# Patient Record
Sex: MALE | Race: WHITE | NOT HISPANIC OR LATINO | Employment: OTHER | URBAN - NONMETROPOLITAN AREA
[De-identification: names, ages, dates, MRNs, and addresses within clinical notes are randomized per-mention and may not be internally consistent; named-entity substitution may affect disease eponyms.]

---

## 2022-03-30 ENCOUNTER — OFFICE VISIT (OUTPATIENT)
Dept: FAMILY MEDICINE CLINIC | Facility: CLINIC | Age: 67
End: 2022-03-30

## 2022-03-30 VITALS
WEIGHT: 204 LBS | HEIGHT: 69 IN | SYSTOLIC BLOOD PRESSURE: 176 MMHG | HEART RATE: 96 BPM | DIASTOLIC BLOOD PRESSURE: 118 MMHG | BODY MASS INDEX: 30.21 KG/M2 | OXYGEN SATURATION: 98 %

## 2022-03-30 DIAGNOSIS — I10 PRIMARY HYPERTENSION: Primary | ICD-10-CM

## 2022-03-30 PROBLEM — I16.9 HYPERTENSIVE CRISIS: Status: ACTIVE | Noted: 2020-01-13

## 2022-03-30 PROBLEM — E66.9 OBESITY WITH BODY MASS INDEX 30 OR GREATER: Status: ACTIVE | Noted: 2020-01-13

## 2022-03-30 PROBLEM — N50.89 TESTICULAR MASS: Status: ACTIVE | Noted: 2020-01-13

## 2022-03-30 PROBLEM — I16.9 HYPERTENSIVE CRISIS: Status: RESOLVED | Noted: 2020-01-13 | Resolved: 2022-03-30

## 2022-03-30 PROCEDURE — 99214 OFFICE O/P EST MOD 30 MIN: CPT | Performed by: PHYSICIAN ASSISTANT

## 2022-03-30 RX ORDER — LISINOPRIL AND HYDROCHLOROTHIAZIDE 20; 12.5 MG/1; MG/1
TABLET ORAL
COMMUNITY
Start: 2022-03-16 | End: 2022-04-25

## 2022-03-30 RX ORDER — AMLODIPINE BESYLATE 5 MG/1
5 TABLET ORAL DAILY
Qty: 30 TABLET | Refills: 5 | Status: SHIPPED | OUTPATIENT
Start: 2022-03-30 | End: 2022-04-25

## 2022-03-30 NOTE — PROGRESS NOTES
"Chief Complaint  Hypertension    Subjective          Jacobo Burroughs presents to [facility]  History of Present Illness patient was started on lisinopril/HCTZ about a month ago and is here for a follow-up.  He states that he has been unable to check his blood pressure at home because his machine is broken.  He has no specific complaints today and reports feeling well.    Objective     Vital Signs:   BP (!) 176/118 (BP Location: Left arm, Patient Position: Sitting, Cuff Size: Large Adult)   Pulse 96   Ht 175.3 cm (69\")   Wt 92.5 kg (204 lb)   SpO2 98%   BMI 30.13 kg/m²     Body mass index is 30.13 kg/m².    Review of Systems   Constitutional: Negative.    HENT: Negative.    Respiratory: Negative.    Cardiovascular: Negative.    Gastrointestinal: Negative.    Neurological: Negative.    Psychiatric/Behavioral: Negative.        Past History:  Medical History: has a past medical history of Hypertension and Hypertensive crisis (1/13/2020).   Surgical History: has a past surgical history that includes Tonsillectomy and adenoidectomy.   Family History: family history includes Diabetes in his father; Hypertension in his father.   Social History: reports that he has never smoked. He has never used smokeless tobacco. He reports current alcohol use of about 3.0 standard drinks of alcohol per week. He reports that he does not use drugs.      Current Outpatient Medications:   •  amLODIPine (NORVASC) 5 MG tablet, Take 1 tablet by mouth Daily., Disp: 30 tablet, Rfl: 5  •  lisinopril-hydrochlorothiazide (PRINZIDE,ZESTORETIC) 20-12.5 MG per tablet, , Disp: , Rfl:     Allergies: Penicillins    Physical Exam  Constitutional:       Appearance: Normal appearance.   Cardiovascular:      Rate and Rhythm: Normal rate and regular rhythm.      Heart sounds: Normal heart sounds.   Pulmonary:      Effort: Pulmonary effort is normal.      Breath sounds: Normal breath sounds.   Abdominal:      General: Bowel " sounds are normal.      Palpations: Abdomen is soft.   Musculoskeletal:         General: Normal range of motion.      Cervical back: Normal range of motion.   Neurological:      General: No focal deficit present.      Mental Status: He is alert and oriented to person, place, and time.   Psychiatric:         Mood and Affect: Mood normal.             Assessment and Plan   Diagnoses and all orders for this visit:    1. Primary hypertension (Primary)  Assessment & Plan:  I am going to add amlodipine 5 mg to his current medications and see him back in a month.  Last blood work was done on 2/9/2022 CBC was normal CMP was normal other than his creatinine was 1.47 with a GFR of 48      Other orders  -     amLODIPine (NORVASC) 5 MG tablet; Take 1 tablet by mouth Daily.  Dispense: 30 tablet; Refill: 5          Follow Up   Return in about 1 month (around 4/30/2022).  Patient was given instructions and counseling regarding his condition or for health maintenance advice. Please see specific information pulled into the AVS if appropriate.     Crystal De Oliveira PA-C

## 2022-03-30 NOTE — ASSESSMENT & PLAN NOTE
I am going to add amlodipine 5 mg to his current medications and see him back in a month.  Last blood work was done on 2/9/2022 CBC was normal CMP was normal other than his creatinine was 1.47 with a GFR of 48

## 2022-04-25 ENCOUNTER — OFFICE VISIT (OUTPATIENT)
Dept: FAMILY MEDICINE CLINIC | Facility: CLINIC | Age: 67
End: 2022-04-25

## 2022-04-25 VITALS
HEART RATE: 89 BPM | DIASTOLIC BLOOD PRESSURE: 98 MMHG | SYSTOLIC BLOOD PRESSURE: 146 MMHG | BODY MASS INDEX: 30.26 KG/M2 | HEIGHT: 69 IN | WEIGHT: 204.3 LBS | OXYGEN SATURATION: 96 %

## 2022-04-25 DIAGNOSIS — I10 PRIMARY HYPERTENSION: Primary | ICD-10-CM

## 2022-04-25 PROCEDURE — 99214 OFFICE O/P EST MOD 30 MIN: CPT | Performed by: PHYSICIAN ASSISTANT

## 2022-04-25 RX ORDER — AMLODIPINE BESYLATE AND BENAZEPRIL HYDROCHLORIDE 5; 40 MG/1; MG/1
1 CAPSULE ORAL DAILY
Qty: 30 CAPSULE | Refills: 5 | Status: SHIPPED | OUTPATIENT
Start: 2022-04-25 | End: 2022-04-28

## 2022-04-25 NOTE — ASSESSMENT & PLAN NOTE
His blood pressure has improved but is not adequately controlled he is very hesitant to adding any more medications he does not like the fluid pill because they make him go to the bathroom all the time and he does not feel like he needs to fluid pill.  I have told him that we are going to make a change his medicine and switch him to amlodipine-benazepril 5-40 and see if this will better control his blood pressure.  He will return in a month.

## 2022-04-25 NOTE — PROGRESS NOTES
"Chief Complaint  Hypertension (Patient complains his blood pressure is still running high. The highest being 155/100)    Subjective          Jacobo Burroughs presents to [facility]  History of Present Illness patient follows up today for his blood pressure stating that it may be slightly better controlled but he does not like the fluid pill and the new medicine and he does not really think it is done much to improve his blood pressure.  He does not want to take \"a bunch of pills\"    Objective     Vital Signs:   /98 (BP Location: Right arm, Patient Position: Sitting, Cuff Size: Large Adult)   Pulse 89   Ht 175.3 cm (69\")   Wt 92.7 kg (204 lb 4.8 oz)   SpO2 96%   BMI 30.17 kg/m²     Body mass index is 30.17 kg/m².    Review of Systems   Constitutional: Negative.    HENT: Negative.    Respiratory: Negative.    Cardiovascular: Negative.    Gastrointestinal: Negative.    Neurological: Negative.    Psychiatric/Behavioral: Negative.        Past History:  Medical History: has a past medical history of Hypertension and Hypertensive crisis (1/13/2020).   Surgical History: has a past surgical history that includes Tonsillectomy and adenoidectomy.   Family History: family history includes Diabetes in his father; Hypertension in his father.   Social History: reports that he has never smoked. He has never used smokeless tobacco. He reports current alcohol use of about 3.0 standard drinks of alcohol per week. He reports that he does not use drugs.      Current Outpatient Medications:   •  amLODIPine-benazepril (LOTREL) 5-40 MG per capsule, Take 1 capsule by mouth Daily., Disp: 30 capsule, Rfl: 5    Allergies: Penicillins    Physical Exam  Constitutional:       Appearance: Normal appearance.   Cardiovascular:      Rate and Rhythm: Normal rate and regular rhythm.      Heart sounds: Normal heart sounds.   Pulmonary:      Effort: Pulmonary effort is normal.      Breath sounds: Normal breath " sounds.   Abdominal:      General: Bowel sounds are normal.      Palpations: Abdomen is soft.   Musculoskeletal:         General: Normal range of motion.      Cervical back: Normal range of motion.   Neurological:      General: No focal deficit present.      Mental Status: He is alert and oriented to person, place, and time.   Psychiatric:         Mood and Affect: Mood normal.             Assessment and Plan   Diagnoses and all orders for this visit:    1. Primary hypertension (Primary)  Assessment & Plan:  His blood pressure has improved but is not adequately controlled he is very hesitant to adding any more medications he does not like the fluid pill because they make him go to the bathroom all the time and he does not feel like he needs to fluid pill.  I have told him that we are going to make a change his medicine and switch him to amlodipine-benazepril 5-40 and see if this will better control his blood pressure.  He will return in a month.      Other orders  -     amLODIPine-benazepril (LOTREL) 5-40 MG per capsule; Take 1 capsule by mouth Daily.  Dispense: 30 capsule; Refill: 5          Follow Up   Return in about 1 month (around 5/25/2022) for Recheck BP.  Patient was given instructions and counseling regarding his condition or for health maintenance advice. Please see specific information pulled into the AVS if appropriate.     Crystal De Oliveira PA-C

## 2022-04-28 ENCOUNTER — TELEPHONE (OUTPATIENT)
Dept: FAMILY MEDICINE CLINIC | Facility: CLINIC | Age: 67
End: 2022-04-28

## 2022-04-28 RX ORDER — LISINOPRIL AND HYDROCHLOROTHIAZIDE 20; 12.5 MG/1; MG/1
2 TABLET ORAL DAILY
Qty: 60 TABLET | Refills: 1 | Status: SHIPPED | OUTPATIENT
Start: 2022-04-28 | End: 2022-06-02 | Stop reason: SDUPTHER

## 2022-04-28 NOTE — TELEPHONE ENCOUNTER
MEANS PATIENT  Patient came in and is having issues with the BP medication he was put on 04/25/2022, and would like to go back to his other BP meds.

## 2022-06-02 ENCOUNTER — OFFICE VISIT (OUTPATIENT)
Dept: FAMILY MEDICINE CLINIC | Facility: CLINIC | Age: 67
End: 2022-06-02

## 2022-06-02 VITALS
WEIGHT: 210.9 LBS | HEART RATE: 74 BPM | OXYGEN SATURATION: 98 % | DIASTOLIC BLOOD PRESSURE: 82 MMHG | BODY MASS INDEX: 31.24 KG/M2 | SYSTOLIC BLOOD PRESSURE: 148 MMHG | HEIGHT: 69 IN

## 2022-06-02 DIAGNOSIS — I10 PRIMARY HYPERTENSION: Primary | ICD-10-CM

## 2022-06-02 PROCEDURE — 99213 OFFICE O/P EST LOW 20 MIN: CPT | Performed by: PHYSICIAN ASSISTANT

## 2022-06-02 RX ORDER — LISINOPRIL AND HYDROCHLOROTHIAZIDE 20; 12.5 MG/1; MG/1
2 TABLET ORAL DAILY
Qty: 180 TABLET | Refills: 1 | Status: SHIPPED | OUTPATIENT
Start: 2022-06-02 | End: 2022-12-02 | Stop reason: SDUPTHER

## 2022-06-02 NOTE — ASSESSMENT & PLAN NOTE
Since his last visit he called and stated that he was not tolerating the combination BP medication we had him on and he asked to be switched back to the Lisinopril-HCTZ, we did that and increased it to 2 pills daily to get better BP control, he is tolerating this well and prefers to stay on it.

## 2022-06-02 NOTE — PROGRESS NOTES
"Chief Complaint  Hypertension (Patient is here for a follow up on his blood pressure.)    Subjective          Jacobo Burroughs presents to [facility]  History of Present Illnesss:  He is coming in today to get a refill of his BP medication.  He says he is tolerating it better than any other meds we have tried and he does not want to make any changes, everytime we try and add anything to this he has dizzy spells and feels bad.  He went back on his Lisinopril/HCTZ.  He is now doing fine.     Objective     Vital Signs:   /82 (BP Location: Right arm)   Pulse 74   Ht 175.3 cm (69\")   Wt 95.7 kg (210 lb 14.4 oz)   SpO2 98%   BMI 31.14 kg/m²     Body mass index is 31.14 kg/m².    Review of Systems   Constitutional: Negative.    HENT: Negative.    Respiratory: Negative.    Cardiovascular: Negative.    Gastrointestinal: Negative.    Neurological: Negative.    Psychiatric/Behavioral: Negative.        Past History:  Medical History: has a past medical history of Hypertension and Hypertensive crisis (1/13/2020).   Surgical History: has a past surgical history that includes Tonsillectomy and adenoidectomy.   Family History: family history includes Diabetes in his father; Hypertension in his father.   Social History: reports that he has never smoked. He has never used smokeless tobacco. He reports current alcohol use of about 3.0 standard drinks of alcohol per week. He reports that he does not use drugs.      Current Outpatient Medications:   •  lisinopril-hydrochlorothiazide (PRINZIDE,ZESTORETIC) 20-12.5 MG per tablet, Take 2 tablets by mouth Daily., Disp: 180 tablet, Rfl: 1    Allergies: Penicillins    Physical Exam  Vitals reviewed.   Constitutional:       Appearance: Normal appearance.   Cardiovascular:      Rate and Rhythm: Normal rate and regular rhythm.      Heart sounds: Normal heart sounds.   Pulmonary:      Effort: Pulmonary effort is normal.      Breath sounds: Normal breath " sounds.   Abdominal:      General: Bowel sounds are normal.      Palpations: Abdomen is soft.   Musculoskeletal:         General: Normal range of motion.   Neurological:      General: No focal deficit present.      Mental Status: He is alert and oriented to person, place, and time.   Psychiatric:         Mood and Affect: Mood normal.             Assessment and Plan   Diagnoses and all orders for this visit:    1. Primary hypertension (Primary)  Assessment & Plan:  Since his last visit he called and stated that he was not tolerating the combination BP medication we had him on and he asked to be switched back to the Lisinopril-HCTZ, we did that and increased it to 2 pills daily to get better BP control, he is tolerating this well and prefers to stay on it.         Other orders  -     lisinopril-hydrochlorothiazide (PRINZIDE,ZESTORETIC) 20-12.5 MG per tablet; Take 2 tablets by mouth Daily.  Dispense: 180 tablet; Refill: 1          Follow Up {Instructions Charge Capture  Follow-up Communications :23}  Return in about 6 months (around 12/2/2022) for Recheck BP.  Patient was given instructions and counseling regarding his condition or for health maintenance advice. Please see specific information pulled into the AVS if appropriate.     Crystal De Oliveira PA-C

## 2022-06-30 ENCOUNTER — TELEPHONE (OUTPATIENT)
Dept: FAMILY MEDICINE CLINIC | Facility: CLINIC | Age: 67
End: 2022-06-30

## 2022-06-30 RX ORDER — LISINOPRIL AND HYDROCHLOROTHIAZIDE 20; 12.5 MG/1; MG/1
2 TABLET ORAL DAILY
Qty: 180 TABLET | Refills: 1 | Status: CANCELLED | OUTPATIENT
Start: 2022-06-30

## 2022-09-14 ENCOUNTER — TELEPHONE (OUTPATIENT)
Dept: FAMILY MEDICINE CLINIC | Facility: CLINIC | Age: 67
End: 2022-09-14

## 2022-09-14 NOTE — TELEPHONE ENCOUNTER
PATIENT HAS BEEN TAKING LISINOPRIL FOR HIGH BLOOD PRESSURE AND IT HAS BEEN MAKING HIM DIZZY.  PATIENT HOPING FOR A DIFFERENT MEDICATION.     PHARMACY:  [facility]    PLEASE CALL [phone]

## 2022-09-16 NOTE — TELEPHONE ENCOUNTER
Caller: Jacobo Burroughs    Relationship: Self    Best call back number:[phone]    What is the best time to reach you: ANY TIME    Who are you requesting to speak with (clinical staff, provider,  specific staff member): BINH MEANS     Do you know the name of the person who called: SELF    What was the call regarding: PATIENT HAS YET TO HERE A RESPONSE TO THIS MESSAGE AND WAS CHECKING THE STATUS. PLEASE CALL PATIENT TODAY.    Do you require a callback: YES

## 2022-09-20 NOTE — TELEPHONE ENCOUNTER
I received this note in my inbox today(9/20/22) I tried calling the patient and there was no answer.  Will you try to reach him and I would suggest he come in for an appt.  So we can check his BP, he has been taking the Lisinopril since June and when we saw him in June he said he was doing fine and not having any dizziness, so I'm not convinced it is the medication causing his dizziness.

## 2022-12-02 ENCOUNTER — OFFICE VISIT (OUTPATIENT)
Dept: FAMILY MEDICINE CLINIC | Facility: CLINIC | Age: 67
End: 2022-12-02

## 2022-12-02 VITALS
HEART RATE: 80 BPM | WEIGHT: 218.6 LBS | OXYGEN SATURATION: 98 % | DIASTOLIC BLOOD PRESSURE: 90 MMHG | SYSTOLIC BLOOD PRESSURE: 160 MMHG | HEIGHT: 69 IN | BODY MASS INDEX: 32.38 KG/M2

## 2022-12-02 DIAGNOSIS — I10 PRIMARY HYPERTENSION: Primary | ICD-10-CM

## 2022-12-02 PROCEDURE — 99213 OFFICE O/P EST LOW 20 MIN: CPT | Performed by: PHYSICIAN ASSISTANT

## 2022-12-02 RX ORDER — LISINOPRIL AND HYDROCHLOROTHIAZIDE 20; 12.5 MG/1; MG/1
2 TABLET ORAL DAILY
Qty: 180 TABLET | Refills: 1 | Status: SHIPPED | OUTPATIENT
Start: 2022-12-02

## 2022-12-02 NOTE — ASSESSMENT & PLAN NOTE
Hypertension is not well controlled but patient is unwilling to take any additional medication he will try changing his diet and getting exercise and seeing if he can drop a few pounds.  He also agreed to reduce salt in his diet and try these natural measures to bring his blood pressure down and he will follow-up in 6 months he wants to consider blood work at that time.

## 2022-12-02 NOTE — PROGRESS NOTES
"Chief Complaint  Hypertension (Patient needs a refill on his med)    Subjective          Jacobo Burroughs presents to [facility]  History of Present Illness patient is here today to follow-up on his hypertension and he states that his blood pressure is running at home in the 150/90 range which he says is okay.  He is adamant that he is not going to take any other medication at this time but he did agree to reduce salt in his diet to try and lose some weight and get better exercise to see if that will improve his blood pressure.    Objective   Vital Signs:   /90 (BP Location: Right arm)   Pulse 80   Ht 175.3 cm (69\")   Wt 99.2 kg (218 lb 9.6 oz)   SpO2 98%   BMI 32.28 kg/m²     Body mass index is 32.28 kg/m².    Review of Systems   Constitutional: Negative.    HENT: Negative.    Eyes: Negative.    Respiratory: Negative.    Cardiovascular: Negative.    Gastrointestinal: Negative.    Endocrine: Negative.    Genitourinary: Negative.    Musculoskeletal: Negative.    Skin: Negative.    Allergic/Immunologic: Negative.    Neurological: Negative.    Hematological: Negative.    Psychiatric/Behavioral: Negative.        Past History:  Medical History: has a past medical history of Hypertension and Hypertensive crisis (1/13/2020).   Surgical History: has a past surgical history that includes Tonsillectomy and adenoidectomy.   Family History: family history includes Diabetes in his father; Hypertension in his father.   Social History: reports that he has never smoked. He has never used smokeless tobacco. He reports current alcohol use of about 3.0 standard drinks per week. He reports that he does not use drugs.      Current Outpatient Medications:   •  lisinopril-hydrochlorothiazide (PRINZIDE,ZESTORETIC) 20-12.5 MG per tablet, Take 2 tablets by mouth Daily., Disp: 180 tablet, Rfl: 1  Allergies: Penicillins    Physical Exam  Vitals reviewed.   Constitutional:       Appearance: Normal " appearance.   Cardiovascular:      Rate and Rhythm: Normal rate and regular rhythm.      Heart sounds: Normal heart sounds.   Pulmonary:      Effort: Pulmonary effort is normal.      Breath sounds: Normal breath sounds.   Abdominal:      General: Bowel sounds are normal.      Palpations: Abdomen is soft.   Musculoskeletal:         General: Normal range of motion.   Neurological:      General: No focal deficit present.      Mental Status: He is alert and oriented to person, place, and time.   Psychiatric:         Mood and Affect: Mood normal.             Assessment and Plan   Diagnoses and all orders for this visit:    1. Primary hypertension (Primary)  Assessment & Plan:  Hypertension is not well controlled but patient is unwilling to take any additional medication he will try changing his diet and getting exercise and seeing if he can drop a few pounds.  He also agreed to reduce salt in his diet and try these natural measures to bring his blood pressure down and he will follow-up in 6 months he wants to consider blood work at that time.      Other orders  -     lisinopril-hydrochlorothiazide (PRINZIDE,ZESTORETIC) 20-12.5 MG per tablet; Take 2 tablets by mouth Daily.  Dispense: 180 tablet; Refill: 1          Follow Up   Return in about 6 months (around 6/2/2023) for Recheck.  Patient was given instructions and counseling regarding his condition or for health maintenance advice. Please see specific information pulled into the AVS if appropriate.     Crystal De Oliveira PA-C

## 2023-06-19 ENCOUNTER — OFFICE VISIT (OUTPATIENT)
Dept: FAMILY MEDICINE CLINIC | Facility: CLINIC | Age: 68
End: 2023-06-19
Payer: MEDICARE

## 2023-06-19 VITALS
DIASTOLIC BLOOD PRESSURE: 78 MMHG | HEIGHT: 69 IN | OXYGEN SATURATION: 97 % | BODY MASS INDEX: 31.68 KG/M2 | HEART RATE: 72 BPM | SYSTOLIC BLOOD PRESSURE: 118 MMHG | WEIGHT: 213.9 LBS

## 2023-06-19 DIAGNOSIS — I10 PRIMARY HYPERTENSION: Primary | ICD-10-CM

## 2023-06-19 DIAGNOSIS — M17.0 PRIMARY OSTEOARTHRITIS OF BOTH KNEES: ICD-10-CM

## 2023-06-19 PROBLEM — N50.89 TESTICULAR MASS: Status: RESOLVED | Noted: 2020-01-13 | Resolved: 2023-06-19

## 2023-06-19 RX ORDER — LISINOPRIL AND HYDROCHLOROTHIAZIDE 20; 12.5 MG/1; MG/1
2 TABLET ORAL DAILY
Qty: 180 TABLET | Refills: 1 | Status: SHIPPED | OUTPATIENT
Start: 2023-06-19

## 2023-06-19 NOTE — ASSESSMENT & PLAN NOTE
Patient's (Body mass index is 31.59 kg/m².) indicates that they are obese (BMI >30) with health conditions that include hypertension . Weight is improving with lifestyle modifications. BMI  is above average; BMI management plan is completed. We discussed low calorie, low carb based diet program, portion control, and increasing exercise.

## 2023-06-19 NOTE — ASSESSMENT & PLAN NOTE
I recommended possibly seeing orthopedics for his knee pain to see if there was something that can be done he may need x-rays etc. but he declines and states that he will just live with the pain because he is not going to do anything for it.

## 2023-06-19 NOTE — PROGRESS NOTES
"Chief Complaint  Hypertension    Subjective          Jacobo Burrougsh presents to [facility]  Hypertension  Pertinent negatives include no blurred vision, chest pain, palpitations or shortness of breath.  she is here today to get his blood pressure medication refilled and he reports that he is not interested in a lot of preventative exams he declines a colonoscopy he declines COVID vaccines.  He really did not want blood work but told him we needed to check his renal function today because of the medications that he takes and he finally agreed to do some routine blood work.    Objective   Vital Signs:   /78 (BP Location: Left arm)   Pulse 72   Ht 175.3 cm (69\")   Wt 97 kg (213 lb 14.4 oz)   SpO2 97%   BMI 31.59 kg/m²     Body mass index is 31.59 kg/m².    Review of Systems   Constitutional:  Negative for fatigue.   Eyes:  Negative for blurred vision.   Respiratory:  Negative for cough, chest tightness and shortness of breath.    Cardiovascular:  Negative for chest pain, palpitations and leg swelling.   Gastrointestinal:  Negative for abdominal pain, constipation, diarrhea, nausea and vomiting.   Musculoskeletal:  Positive for arthralgias (Patient reported bilateral knee pain).   Neurological:  Negative for dizziness, tremors and headache.   Psychiatric/Behavioral:  Negative for depressed mood. The patient is not nervous/anxious.      Past History:  Medical History: has a past medical history of Hypertension, Hypertensive crisis (01/13/2020), and Testicular mass (01/13/2020).   Surgical History: has a past surgical history that includes Tonsillectomy and adenoidectomy.   Family History: family history includes Diabetes in his father; Hypertension in his father.   Social History: reports that he has never smoked. He has never used smokeless tobacco. He reports current alcohol use of about 3.0 standard drinks per week. He reports that he does not use drugs.      Current " Outpatient Medications:     lisinopril-hydrochlorothiazide (PRINZIDE,ZESTORETIC) 20-12.5 MG per tablet, Take 2 tablets by mouth Daily., Disp: 180 tablet, Rfl: 1  Allergies: Penicillins    Physical Exam  Vitals reviewed.   Constitutional:       Appearance: Normal appearance.   Cardiovascular:      Rate and Rhythm: Normal rate and regular rhythm.      Heart sounds: Normal heart sounds.   Pulmonary:      Effort: Pulmonary effort is normal.      Breath sounds: Normal breath sounds.   Abdominal:      General: Bowel sounds are normal.      Palpations: Abdomen is soft.   Musculoskeletal:         General: Normal range of motion.      Comments: Patient had difficulty getting up on the exam table due to his bilateral knee pain.   Neurological:      General: No focal deficit present.      Mental Status: He is alert and oriented to person, place, and time.   Psychiatric:         Mood and Affect: Mood normal.           Assessment and Plan   Diagnoses and all orders for this visit:    1. Primary hypertension (Primary)  Assessment & Plan:  Hypertension is trolled.  We will continue his present medication and will obtain some limited screening blood work.    Orders:  -     Comprehensive Metabolic Panel; Future  -     Lipid Panel; Future  -     Comprehensive Metabolic Panel  -     Lipid Panel    2. Primary osteoarthritis of both knees  Assessment & Plan:  I recommended possibly seeing orthopedics for his knee pain to see if there was something that can be done he may need x-rays etc. but he declines and states that he will just live with the pain because he is not going to do anything for it.      Other orders  -     lisinopril-hydrochlorothiazide (PRINZIDE,ZESTORETIC) 20-12.5 MG per tablet; Take 2 tablets by mouth Daily.  Dispense: 180 tablet; Refill: 1            Follow Up   Return in about 1 year (around 6/19/2024) for Annual physical, Recheck.  Patient was given instructions and counseling regarding his condition or for health  maintenance advice. Please see specific information pulled into the AVS if appropriate.     Crystal De Oliveira PA-C

## 2023-06-19 NOTE — ASSESSMENT & PLAN NOTE
Hypertension is trolled.  We will continue his present medication and will obtain some limited screening blood work.

## 2023-06-20 LAB
ALBUMIN SERPL-MCNC: 4.8 G/DL (ref 3.8–4.8)
ALBUMIN/GLOB SERPL: 2 {RATIO} (ref 1.2–2.2)
ALP SERPL-CCNC: 91 IU/L (ref 44–121)
ALT SERPL-CCNC: 20 IU/L (ref 0–44)
AST SERPL-CCNC: 19 IU/L (ref 0–40)
BILIRUB SERPL-MCNC: 0.7 MG/DL (ref 0–1.2)
BUN SERPL-MCNC: 15 MG/DL (ref 8–27)
BUN/CREAT SERPL: 11 (ref 10–24)
CALCIUM SERPL-MCNC: 10.2 MG/DL (ref 8.6–10.2)
CHLORIDE SERPL-SCNC: 100 MMOL/L (ref 96–106)
CHOLEST SERPL-MCNC: 253 MG/DL (ref 100–199)
CO2 SERPL-SCNC: 24 MMOL/L (ref 20–29)
CREAT SERPL-MCNC: 1.31 MG/DL (ref 0.76–1.27)
EGFRCR SERPLBLD CKD-EPI 2021: 60 ML/MIN/1.73
GLOBULIN SER CALC-MCNC: 2.4 G/DL (ref 1.5–4.5)
GLUCOSE SERPL-MCNC: 99 MG/DL (ref 70–99)
HDLC SERPL-MCNC: 51 MG/DL
LDLC SERPL CALC-MCNC: 159 MG/DL (ref 0–99)
POTASSIUM SERPL-SCNC: 4 MMOL/L (ref 3.5–5.2)
PROT SERPL-MCNC: 7.2 G/DL (ref 6–8.5)
SODIUM SERPL-SCNC: 141 MMOL/L (ref 134–144)
TRIGL SERPL-MCNC: 231 MG/DL (ref 0–149)
VLDLC SERPL CALC-MCNC: 43 MG/DL (ref 5–40)

## 2023-06-22 ENCOUNTER — TELEPHONE (OUTPATIENT)
Dept: FAMILY MEDICINE CLINIC | Facility: CLINIC | Age: 68
End: 2023-06-22

## 2023-06-22 NOTE — TELEPHONE ENCOUNTER
HUB READ RESULTS TO PATIENT PER PREVIOUS ENCOUNTER; PATIENT UNDERSTOOD BUT DOES NOT WANT TO TAKE MEDICATION FOR CHOLESTEROL

## 2024-06-20 ENCOUNTER — OFFICE VISIT (OUTPATIENT)
Dept: FAMILY MEDICINE CLINIC | Facility: CLINIC | Age: 69
End: 2024-06-20
Payer: MEDICARE

## 2024-06-20 VITALS
BODY MASS INDEX: 31.68 KG/M2 | WEIGHT: 213.9 LBS | DIASTOLIC BLOOD PRESSURE: 94 MMHG | OXYGEN SATURATION: 98 % | HEIGHT: 69 IN | SYSTOLIC BLOOD PRESSURE: 178 MMHG | HEART RATE: 84 BPM

## 2024-06-20 DIAGNOSIS — E66.9 CLASS 1 OBESITY WITH SERIOUS COMORBIDITY AND BODY MASS INDEX (BMI) OF 31.0 TO 31.9 IN ADULT, UNSPECIFIED OBESITY TYPE: ICD-10-CM

## 2024-06-20 DIAGNOSIS — M17.0 PRIMARY OSTEOARTHRITIS OF BOTH KNEES: ICD-10-CM

## 2024-06-20 DIAGNOSIS — I10 PRIMARY HYPERTENSION: Primary | ICD-10-CM

## 2024-06-20 PROBLEM — E66.811 CLASS 1 OBESITY WITH SERIOUS COMORBIDITY AND BODY MASS INDEX (BMI) OF 31.0 TO 31.9 IN ADULT: Status: ACTIVE | Noted: 2020-01-13

## 2024-06-20 PROBLEM — E66.811 CLASS 1 OBESITY WITH SERIOUS COMORBIDITY AND BODY MASS INDEX (BMI) OF 31.0 TO 31.9 IN ADULT: Status: ACTIVE | Noted: 2024-06-20

## 2024-06-20 PROBLEM — Z00.00 ENCOUNTER FOR ROUTINE ADULT MEDICAL EXAMINATION: Status: ACTIVE | Noted: 2024-06-20

## 2024-06-20 PROCEDURE — 1160F RVW MEDS BY RX/DR IN RCRD: CPT | Performed by: PHYSICIAN ASSISTANT

## 2024-06-20 PROCEDURE — 1159F MED LIST DOCD IN RCRD: CPT | Performed by: PHYSICIAN ASSISTANT

## 2024-06-20 PROCEDURE — 3077F SYST BP >= 140 MM HG: CPT | Performed by: PHYSICIAN ASSISTANT

## 2024-06-20 PROCEDURE — 99214 OFFICE O/P EST MOD 30 MIN: CPT | Performed by: PHYSICIAN ASSISTANT

## 2024-06-20 PROCEDURE — 36415 COLL VENOUS BLD VENIPUNCTURE: CPT | Performed by: PHYSICIAN ASSISTANT

## 2024-06-20 PROCEDURE — G2211 COMPLEX E/M VISIT ADD ON: HCPCS | Performed by: PHYSICIAN ASSISTANT

## 2024-06-20 PROCEDURE — 3080F DIAST BP >= 90 MM HG: CPT | Performed by: PHYSICIAN ASSISTANT

## 2024-06-20 RX ORDER — METOPROLOL SUCCINATE 50 MG/1
50 TABLET, EXTENDED RELEASE ORAL DAILY
Qty: 90 TABLET | Refills: 1 | Status: SHIPPED | OUTPATIENT
Start: 2024-06-20

## 2024-06-20 RX ORDER — LISINOPRIL AND HYDROCHLOROTHIAZIDE 20; 12.5 MG/1; MG/1
2 TABLET ORAL DAILY
Qty: 180 TABLET | Refills: 1 | Status: SHIPPED | OUTPATIENT
Start: 2024-06-20

## 2024-06-20 RX ORDER — LISINOPRIL AND HYDROCHLOROTHIAZIDE 20; 12.5 MG/1; MG/1
2 TABLET ORAL DAILY
Qty: 180 TABLET | Refills: 3 | Status: CANCELLED | OUTPATIENT
Start: 2024-06-20

## 2024-06-20 NOTE — ASSESSMENT & PLAN NOTE
We discussed the importance of getting his blood pressure under better control and while he is very hesitant to take additional medication he finally agreed coaxing to try metoprolol 50 mg daily and he will follow-up in 6 months for his blood pressure.  He also reluctantly agreed to get his blood work done today.

## 2024-06-20 NOTE — PROGRESS NOTES
"Chief Complaint  Annual Exam    Subjective          Jacobo Burroughs presents to [facility]    History of Present Illness patient is here today for his annual physical and  medication refill.    He has no specific complaints today and reports that he is doing well.    Objective   Vital Signs:   /94 (BP Location: Left arm, Patient Position: Sitting, Cuff Size: Large Adult)   Pulse 84   Ht 175.3 cm (69\")   Wt 97 kg (213 lb 14.4 oz)   SpO2 98%   BMI 31.59 kg/m²     Body mass index is 31.59 kg/m².    Review of Systems   Constitutional: Negative.  Negative for fatigue.   HENT: Negative.     Eyes: Negative.  Negative for blurred vision.   Respiratory: Negative.  Negative for cough, chest tightness and shortness of breath.    Cardiovascular: Negative.  Negative for chest pain, palpitations and leg swelling.   Gastrointestinal: Negative.  Negative for abdominal pain, constipation, diarrhea, nausea and vomiting.   Endocrine: Negative.    Genitourinary: Negative.    Musculoskeletal: Negative.    Skin: Negative.    Allergic/Immunologic: Negative.    Neurological:  Negative for dizziness, tremors and headache.   Hematological: Negative.    Psychiatric/Behavioral: Negative.  Negative for depressed mood. The patient is not nervous/anxious.        Past History:  Medical History: has a past medical history of Hypertension, Hypertensive crisis (01/13/2020), and Testicular mass (01/13/2020).   Surgical History: has a past surgical history that includes Tonsillectomy and adenoidectomy.   Family History: family history includes Diabetes in his father; Hypertension in his father.   Social History: reports that he has never smoked. He has never used smokeless tobacco. He reports current alcohol use of about 3.0 standard drinks of alcohol per week. He reports that he does not use drugs.      Current Outpatient Medications:     lisinopril-hydrochlorothiazide (PRINZIDE,ZESTORETIC) 20-12.5 MG per " tablet, Take 2 tablets by mouth Daily., Disp: 180 tablet, Rfl: 1    metoprolol succinate XL (Toprol XL) 50 MG 24 hr tablet, Take 1 tablet by mouth Daily., Disp: 90 tablet, Rfl: 1    Allergies: Penicillins    Physical Exam  Vitals reviewed.   Constitutional:       Appearance: He is obese.   HENT:      Head: Normocephalic and atraumatic.      Right Ear: Tympanic membrane, ear canal and external ear normal.      Left Ear: Tympanic membrane, ear canal and external ear normal.      Nose: Nose normal.      Mouth/Throat:      Mouth: Mucous membranes are moist.   Eyes:      Extraocular Movements: Extraocular movements intact.      Pupils: Pupils are equal, round, and reactive to light.   Cardiovascular:      Rate and Rhythm: Normal rate and regular rhythm.      Pulses: Normal pulses.      Heart sounds: Normal heart sounds.   Pulmonary:      Effort: Pulmonary effort is normal.      Breath sounds: Normal breath sounds.   Abdominal:      General: Abdomen is flat. Bowel sounds are normal.      Palpations: Abdomen is soft.   Musculoskeletal:         General: Normal range of motion.      Cervical back: Normal range of motion and neck supple.   Skin:     General: Skin is warm and dry.   Neurological:      General: No focal deficit present.      Mental Status: He is alert and oriented to person, place, and time.   Psychiatric:         Mood and Affect: Mood normal.         Behavior: Behavior normal.          Result Review :                   Assessment and Plan    Diagnoses and all orders for this visit:    1. Primary hypertension (Primary)  Assessment & Plan:  We discussed the importance of getting his blood pressure under better control and while he is very hesitant to take additional medication he finally agreed coaxing to try metoprolol 50 mg daily and he will follow-up in 6 months for his blood pressure.  He also reluctantly agreed to get his blood work done today.    Orders:  -     CBC & Differential; Future  -      Comprehensive Metabolic Panel; Future  -     Lipid Panel; Future  -     CBC & Differential  -     Comprehensive Metabolic Panel  -     Lipid Panel    2. Class 1 obesity with serious comorbidity and body mass index (BMI) of 31.0 to 31.9 in adult, unspecified obesity type  Assessment & Plan:  Patient's (Body mass index is 31.59 kg/m².) indicates that they are obese (BMI >30) with health conditions that include hypertension . Weight is unchanged. BMI  is above average; BMI management plan is completed. We discussed low calorie, low carb based diet program, portion control, and increasing exercise.     Orders:  -     Hemoglobin A1c; Future  -     TSH; Future  -     Hemoglobin A1c  -     TSH    3. Primary osteoarthritis of both knees  Assessment & Plan:  Patient admits to having bilateral knee pain but he does not want to see anyone regarding this and he declines any treatment.      Other orders  -     lisinopril-hydrochlorothiazide (PRINZIDE,ZESTORETIC) 20-12.5 MG per tablet; Take 2 tablets by mouth Daily.  Dispense: 180 tablet; Refill: 1  -     metoprolol succinate XL (Toprol XL) 50 MG 24 hr tablet; Take 1 tablet by mouth Daily.  Dispense: 90 tablet; Refill: 1        Follow Up   Return in about 6 months (around 12/20/2024) for Recheck.  Patient was given instructions and counseling regarding his condition or for health maintenance advice. Please see specific information pulled into the AVS if appropriate.     Crystal De Oliveira PA-C

## 2024-06-20 NOTE — ASSESSMENT & PLAN NOTE
Patient's (Body mass index is 31.59 kg/m².) indicates that they are obese (BMI >30) with health conditions that include hypertension . Weight is unchanged. BMI  is above average; BMI management plan is completed. We discussed low calorie, low carb based diet program, portion control, and increasing exercise.

## 2024-06-20 NOTE — ASSESSMENT & PLAN NOTE
Patient admits to having bilateral knee pain but he does not want to see anyone regarding this and he declines any treatment.

## 2024-06-21 ENCOUNTER — TELEPHONE (OUTPATIENT)
Dept: FAMILY MEDICINE CLINIC | Facility: CLINIC | Age: 69
End: 2024-06-21
Payer: MEDICARE

## 2024-06-21 LAB
ALBUMIN SERPL-MCNC: 4.6 G/DL (ref 3.9–4.9)
ALP SERPL-CCNC: 96 IU/L (ref 44–121)
ALT SERPL-CCNC: 19 IU/L (ref 0–44)
AST SERPL-CCNC: 22 IU/L (ref 0–40)
BASOPHILS # BLD AUTO: 0.1 X10E3/UL (ref 0–0.2)
BASOPHILS NFR BLD AUTO: 1 %
BILIRUB SERPL-MCNC: 0.6 MG/DL (ref 0–1.2)
BUN SERPL-MCNC: 21 MG/DL (ref 8–27)
BUN/CREAT SERPL: 16 (ref 10–24)
CALCIUM SERPL-MCNC: 9.5 MG/DL (ref 8.6–10.2)
CHLORIDE SERPL-SCNC: 99 MMOL/L (ref 96–106)
CHOLEST SERPL-MCNC: 231 MG/DL (ref 100–199)
CO2 SERPL-SCNC: 21 MMOL/L (ref 20–29)
CREAT SERPL-MCNC: 1.3 MG/DL (ref 0.76–1.27)
EGFRCR SERPLBLD CKD-EPI 2021: 61 ML/MIN/1.73
EOSINOPHIL # BLD AUTO: 0.1 X10E3/UL (ref 0–0.4)
EOSINOPHIL NFR BLD AUTO: 1 %
ERYTHROCYTE [DISTWIDTH] IN BLOOD BY AUTOMATED COUNT: 12.1 % (ref 11.6–15.4)
GLOBULIN SER CALC-MCNC: 2.6 G/DL (ref 1.5–4.5)
GLUCOSE SERPL-MCNC: 88 MG/DL (ref 70–99)
HBA1C MFR BLD: 5.6 % (ref 4.8–5.6)
HCT VFR BLD AUTO: 53 % (ref 37.5–51)
HDLC SERPL-MCNC: 42 MG/DL
HGB BLD-MCNC: 17.7 G/DL (ref 13–17.7)
IMM GRANULOCYTES # BLD AUTO: 0.1 X10E3/UL (ref 0–0.1)
IMM GRANULOCYTES NFR BLD AUTO: 1 %
LDLC SERPL CALC-MCNC: 156 MG/DL (ref 0–99)
LYMPHOCYTES # BLD AUTO: 2 X10E3/UL (ref 0.7–3.1)
LYMPHOCYTES NFR BLD AUTO: 21 %
MCH RBC QN AUTO: 29.8 PG (ref 26.6–33)
MCHC RBC AUTO-ENTMCNC: 33.4 G/DL (ref 31.5–35.7)
MCV RBC AUTO: 89 FL (ref 79–97)
MONOCYTES # BLD AUTO: 0.7 X10E3/UL (ref 0.1–0.9)
MONOCYTES NFR BLD AUTO: 7 %
NEUTROPHILS # BLD AUTO: 6.5 X10E3/UL (ref 1.4–7)
NEUTROPHILS NFR BLD AUTO: 69 %
PLATELET # BLD AUTO: 309 X10E3/UL (ref 150–450)
POTASSIUM SERPL-SCNC: 4 MMOL/L (ref 3.5–5.2)
PROT SERPL-MCNC: 7.2 G/DL (ref 6–8.5)
RBC # BLD AUTO: 5.93 X10E6/UL (ref 4.14–5.8)
SODIUM SERPL-SCNC: 138 MMOL/L (ref 134–144)
TRIGL SERPL-MCNC: 179 MG/DL (ref 0–149)
TSH SERPL DL<=0.005 MIU/L-ACNC: 1.82 UIU/ML (ref 0.45–4.5)
VLDLC SERPL CALC-MCNC: 33 MG/DL (ref 5–40)
WBC # BLD AUTO: 9.4 X10E3/UL (ref 3.4–10.8)

## 2024-06-21 NOTE — TELEPHONE ENCOUNTER
Name: Jacobo Burroughs    Relationship: Self    Best Callback Number: [phone]    HUB PROVIDED THE RELAY MESSAGE FROM THE OFFICE   PATIENT VOICED UNDERSTANDING AND HAS NO FURTHER QUESTIONS AT THIS TIME    ADDITIONAL INFORMATION:

## 2024-06-21 NOTE — TELEPHONE ENCOUNTER
HUB TO RELAY    His blood work looks pretty stable similar to what we saw the last time his kidney function just slightly above normal with a creatinine of 1.3 and normal is up to 1.27 but his EGFR was greater than 59 so this is something that we would just simply watch at this point in time.  His cholesterol is high the LDL or bad cholesterol was 156 slightly improved from last time which was 159 but not significantly improved and unless he is willing to take cholesterol medication I would say diet and exercise are his only options and that would mean a low-cholesterol diet and daily exercise such as walking.  The rest of his blood work was fine.

## 2024-06-25 ENCOUNTER — TELEPHONE (OUTPATIENT)
Dept: FAMILY MEDICINE CLINIC | Facility: CLINIC | Age: 69
End: 2024-06-25
Payer: MEDICARE

## 2024-06-25 NOTE — TELEPHONE ENCOUNTER
Name: BurroughsJacobo lópez      Relationship: Self      Best Callback Number: [phone]      HUB PROVIDED THE RELAY MESSAGE FROM THE OFFICE      PATIENT: VOICED UNDERSTANDING AND HAS NO FURTHER QUESTIONS AT THIS TIME    ADDITIONAL INFORMATION: WILL DO DIET AND EXERCISE AT THIS TIME AND NO CHOLESTEROL MEDS

## 2024-06-25 NOTE — TELEPHONE ENCOUNTER
Left Message: If pt calls back ok for HUB to relay--His blood work looks pretty stable similar to what we saw the last time his kidney function just slightly above normal with a creatinine of 1.3 and normal is up to 1.27 but his EGFR was greater than 59 so this is something that we would just simply watch at this point in time.  His cholesterol is high the LDL or bad cholesterol was 156 slightly improved from last time which was 159 but not significantly improved and unless he is willing to take cholesterol medication I would say diet and exercise are his only options and that would mean a low-cholesterol diet and daily exercise such as walking.  The rest of his blood work was fine.

## 2024-12-16 RX ORDER — LISINOPRIL AND HYDROCHLOROTHIAZIDE 12.5; 2 MG/1; MG/1
2 TABLET ORAL DAILY
Qty: 180 TABLET | Refills: 1 | Status: SHIPPED | OUTPATIENT
Start: 2024-12-16

## 2025-04-28 ENCOUNTER — TELEPHONE (OUTPATIENT)
Dept: FAMILY MEDICINE CLINIC | Facility: CLINIC | Age: 70
End: 2025-04-28

## 2025-04-29 ENCOUNTER — OFFICE VISIT (OUTPATIENT)
Dept: FAMILY MEDICINE CLINIC | Facility: CLINIC | Age: 70
End: 2025-04-29
Payer: MEDICARE

## 2025-04-29 VITALS
DIASTOLIC BLOOD PRESSURE: 118 MMHG | HEART RATE: 88 BPM | SYSTOLIC BLOOD PRESSURE: 190 MMHG | HEIGHT: 69 IN | BODY MASS INDEX: 31.93 KG/M2 | WEIGHT: 215.6 LBS | OXYGEN SATURATION: 98 %

## 2025-04-29 DIAGNOSIS — I10 PRIMARY HYPERTENSION: ICD-10-CM

## 2025-04-29 DIAGNOSIS — Z76.89 ENCOUNTER TO ESTABLISH CARE: Primary | ICD-10-CM

## 2025-04-29 DIAGNOSIS — Z91.148 NONADHERENCE TO MEDICATION: ICD-10-CM

## 2025-04-29 DIAGNOSIS — E78.2 MIXED HYPERLIPIDEMIA: ICD-10-CM

## 2025-04-29 RX ORDER — AMLODIPINE AND VALSARTAN 5; 160 MG/1; MG/1
1 TABLET ORAL DAILY
Qty: 30 TABLET | Refills: 3 | Status: SHIPPED | OUTPATIENT
Start: 2025-04-29 | End: 2026-04-29

## 2025-04-29 NOTE — PROGRESS NOTES
New Patient Office Visit      Patient Name: Jacobo Burroughs  : 1955   MRN: [id number]     Chief Complaint:    Chief Complaint   Patient presents with    Establish Care     Pt presents to establish care with Dr. Lora; former pt of [facility]. Med hx includes HTN. Updated med list. Pt would like to discuss alternative medication therapy. He states Lisinopril-HCTZ causes dizziness and has caused him to fall several times. He has stopped taking Metoprolol because he says it was causing him excessive tiredness and he was sleeping all the time       History of Present Illness: Jacobo Burroughs is a 69 y.o. male who is here today to establish.  Patient has quit his 2 blood pressure medications because he states he does not like the way they make him feel.  He complains of dizziness.  He has been on this regimen for a couple of years.  He also states he is not interested in taking cholesterol medication for his elevated cholesterol.  He denies any other complaints today.    Subjective      Review of Systems:   Review of Systems   All other systems reviewed and are negative.      Past Medical History:   Past Medical History:   Diagnosis Date    Hypertension     Hypertensive crisis 2020    Testicular mass 2020       Past Surgical History:   Past Surgical History:   Procedure Laterality Date    TONSILLECTOMY AND ADENOIDECTOMY         Family History:   Family History   Problem Relation Age of Onset    Diabetes Father     Hypertension Father        Social History:   Social History     Socioeconomic History    Marital status: Single   Tobacco Use    Smoking status: Never    Smokeless tobacco: Never   Vaping Use    Vaping status: Never Used   Substance and Sexual Activity    Alcohol use: Not Currently     Alcohol/week: 3.0 standard drinks of alcohol     Types: 3 Cans of beer per week    Drug use: Never    Sexual activity: Not Currently       Medications:     Current Outpatient Medications:      "amLODIPine-valsartan (EXFORGE) 5-160 MG per tablet, Take 1 tablet by mouth Daily., Disp: 30 tablet, Rfl: 3    Allergies:   Allergies   Allergen Reactions    Penicillins Rash       Objective     Physical Exam:  Vital Signs:   Vitals:    04/29/25 1421   BP: (!) 190/118   BP Location: Left arm   Patient Position: Sitting   Cuff Size: Large Adult   Pulse: 88   SpO2: 98%   Weight: 97.8 kg (215 lb 9.6 oz)   Height: 175.3 cm (69\")   PainSc: 0-No pain     Body mass index is 31.84 kg/m².   Facility age limit for growth %shane is 20 years.    Physical Exam  Vitals and nursing note reviewed.   Cardiovascular:      Rate and Rhythm: Normal rate and regular rhythm.   Pulmonary:      Effort: Pulmonary effort is normal.      Breath sounds: Normal breath sounds.         Procedures    PHQ-9 Total Score:      Assessment / Plan      Assessment/Plan:   Assessment & Plan  Encounter to establish care  Past medical history, recent labs, medications reviewed and discussed with the patient.       Primary hypertension  Poorly controlled.  Will trial amlodipine-valsartan combination.  Will plan to see back in a couple of weeks for blood pressure recheck.  Patient is to notify me if he has any issues with the medication or with his blood pressure.         Mixed hyperlipidemia  Patient declines prescription for hyperlipidemia.  He states he is not worried about his cholesterol.         Nonadherence to medication  See above.  Patient has adhered to BP meds as directed.  Will trial a different medication to see if he tolerates better.                     Follow Up:   Return in about 2 weeks (around 5/13/2025) for Recheck.      BETTY Lora MD  [facility]  "

## 2025-04-29 NOTE — ASSESSMENT & PLAN NOTE
Poorly controlled.  Will trial amlodipine-valsartan combination.  Will plan to see back in a couple of weeks for blood pressure recheck.  Patient is to notify me if he has any issues with the medication or with his blood pressure.

## 2025-05-14 ENCOUNTER — OFFICE VISIT (OUTPATIENT)
Dept: FAMILY MEDICINE CLINIC | Facility: CLINIC | Age: 70
End: 2025-05-14
Payer: MEDICARE

## 2025-05-14 VITALS
DIASTOLIC BLOOD PRESSURE: 102 MMHG | OXYGEN SATURATION: 97 % | HEART RATE: 91 BPM | WEIGHT: 214.2 LBS | SYSTOLIC BLOOD PRESSURE: 186 MMHG | BODY MASS INDEX: 31.73 KG/M2 | HEIGHT: 69 IN

## 2025-05-14 DIAGNOSIS — I10 PRIMARY HYPERTENSION: Primary | ICD-10-CM

## 2025-05-14 RX ORDER — AMLODIPINE AND VALSARTAN 10; 320 MG/1; MG/1
1 TABLET ORAL DAILY
Qty: 30 TABLET | Refills: 11 | Status: SHIPPED | OUTPATIENT
Start: 2025-05-14 | End: 2026-05-14

## 2025-05-14 NOTE — ASSESSMENT & PLAN NOTE
Poorly controlled but improved.  Will increase amlodipine-valsartan to 10/320 daily.  See back in about 2 weeks.

## 2025-05-14 NOTE — PROGRESS NOTES
"     Follow Up Office Visit      Patient Name: Jacobo Burroughs  : 1955   MRN: [id number]     Chief Complaint:    Chief Complaint   Patient presents with    Hypertension     Pt presents for a 2 week follow up on HTN. At his  visit, Dr. Lora started pt on a trial of Amlodipine-Valsartan. Pt states he is doing okay with the medication; does not know \"if it's working or not.\" Pt does state he feels tired and fatigued but doesn't know if it's the med causing it       History of Present Illness: Jacobo Burroughs is a 69 y.o. male who is here today for follow up with hypertension.  Patient states BP has improved some at home but remains significantly elevated.  He denies any other complaints today.  He does report to be taking the medication but states it makes him feel fatigued.    Subjective      Review of Systems:   Review of Systems   Constitutional:  Negative for chills, diaphoresis, fatigue and fever.   HENT:  Negative for congestion, sore throat and swollen glands.    Respiratory:  Negative for cough.    Cardiovascular:  Negative for chest pain.   Gastrointestinal:  Negative for abdominal pain, nausea and vomiting.   Genitourinary:  Negative for dysuria.   Musculoskeletal:  Negative for myalgias and neck pain.   Skin:  Negative for rash.   Neurological:  Negative for weakness and numbness.       The following portions of the patient's history were reviewed and updated as appropriate: allergies, current medications, past family history, past medical history, past social history, past surgical history and problem list.    Medications:     Current Outpatient Medications:     amLODIPine-valsartan (EXFORGE)  MG per tablet, Take 1 tablet by mouth Daily., Disp: 30 tablet, Rfl: 11    Allergies:   Allergies   Allergen Reactions    Penicillins Rash       Objective     Physical Exam:  Vital Signs:   Vitals:    25 1409   BP: (!) 186/102   BP Location: Left arm   Patient Position: Sitting   Cuff Size: Large " "Adult   Pulse: 91   SpO2: 97%   Weight: 97.2 kg (214 lb 3.2 oz)   Height: 175.3 cm (69\")     Body mass index is 31.63 kg/m².   Facility age limit for growth %shane is 20 years.    Physical Exam  Vitals and nursing note reviewed.   Cardiovascular:      Rate and Rhythm: Normal rate and regular rhythm.   Pulmonary:      Effort: Pulmonary effort is normal.         Procedures    PHQ-9 Total Score:      Assessment / Plan      Assessment/Plan:   Assessment & Plan  Primary hypertension  Poorly controlled but improved.  Will increase amlodipine-valsartan to 10/320 daily.  See back in about 2 weeks.                       Follow Up:   Return in about 2 weeks (around 5/28/2025) for Recheck.      BETTY Lora MD  [facility]  Answers submitted by the patient for this visit:  Problem not listed (Submitted on 5/7/2025)  Chief Complaint: Other medical problem  anorexia: No  joint pain: No  change in stool: No  headaches: No  joint swelling: No  vertigo: No  visual change: No  Onset: at an unknown time  Chronicity: recurrent  Frequency: intermittently    "